# Patient Record
Sex: FEMALE | Race: WHITE | NOT HISPANIC OR LATINO
[De-identification: names, ages, dates, MRNs, and addresses within clinical notes are randomized per-mention and may not be internally consistent; named-entity substitution may affect disease eponyms.]

---

## 2018-01-03 ENCOUNTER — TRANSCRIPTION ENCOUNTER (OUTPATIENT)
Age: 33
End: 2018-01-03

## 2018-02-17 PROBLEM — Z00.00 ENCOUNTER FOR PREVENTIVE HEALTH EXAMINATION: Status: ACTIVE | Noted: 2018-02-17

## 2018-03-19 ENCOUNTER — APPOINTMENT (OUTPATIENT)
Dept: OTOLARYNGOLOGY | Facility: CLINIC | Age: 33
End: 2018-03-19

## 2018-05-18 ENCOUNTER — TRANSCRIPTION ENCOUNTER (OUTPATIENT)
Age: 33
End: 2018-05-18

## 2018-11-20 ENCOUNTER — TRANSCRIPTION ENCOUNTER (OUTPATIENT)
Age: 33
End: 2018-11-20

## 2019-09-02 ENCOUNTER — RESULT CHARGE (OUTPATIENT)
Age: 34
End: 2019-09-02

## 2019-09-03 ENCOUNTER — OUTPATIENT (OUTPATIENT)
Dept: OUTPATIENT SERVICES | Age: 34
LOS: 1 days | Discharge: HOME | End: 2019-09-03

## 2019-09-03 PROBLEM — Q21.2 COMPLETE AV CANAL: Status: ACTIVE | Noted: 2019-09-03

## 2019-09-03 PROBLEM — Q90.9 TRISOMY 21, DOWN SYNDROME: Status: ACTIVE | Noted: 2019-09-03

## 2019-09-04 ENCOUNTER — APPOINTMENT (OUTPATIENT)
Dept: PEDIATRIC CARDIOLOGY | Facility: CLINIC | Age: 34
End: 2019-09-04
Payer: MEDICAID

## 2019-09-04 VITALS
SYSTOLIC BLOOD PRESSURE: 103 MMHG | HEART RATE: 65 BPM | BODY MASS INDEX: 28.69 KG/M2 | OXYGEN SATURATION: 97 % | DIASTOLIC BLOOD PRESSURE: 54 MMHG | HEIGHT: 56.69 IN | WEIGHT: 131.18 LBS

## 2019-09-04 DIAGNOSIS — Z87.19 PERSONAL HISTORY OF OTHER DISEASES OF THE DIGESTIVE SYSTEM: ICD-10-CM

## 2019-09-04 DIAGNOSIS — Q21.2 ATRIOVENTRICULAR SEPTAL DEFECT: ICD-10-CM

## 2019-09-04 DIAGNOSIS — Z87.09 PERSONAL HISTORY OF OTHER DISEASES OF THE RESPIRATORY SYSTEM: ICD-10-CM

## 2019-09-04 DIAGNOSIS — Q90.9 DOWN SYNDROME, UNSPECIFIED: ICD-10-CM

## 2019-09-04 PROCEDURE — 93303 ECHO TRANSTHORACIC: CPT

## 2019-09-04 PROCEDURE — 93320 DOPPLER ECHO COMPLETE: CPT

## 2019-09-04 PROCEDURE — 99204 OFFICE O/P NEW MOD 45 MIN: CPT | Mod: 25

## 2019-09-04 PROCEDURE — 93000 ELECTROCARDIOGRAM COMPLETE: CPT

## 2019-09-04 PROCEDURE — 93325 DOPPLER ECHO COLOR FLOW MAPG: CPT

## 2019-09-04 RX ORDER — UBIDECARENONE/VIT E ACET 100MG-5
25 MCG CAPSULE ORAL
Refills: 0 | Status: DISCONTINUED | COMMUNITY
End: 2019-09-04

## 2019-09-04 RX ORDER — LORATADINE 10 MG
17 TABLET,DISINTEGRATING ORAL
Refills: 0 | Status: ACTIVE | COMMUNITY

## 2019-09-04 RX ORDER — LORATADINE 10 MG
TABLET ORAL
Refills: 0 | Status: ACTIVE | COMMUNITY

## 2019-09-04 NOTE — REASON FOR VISIT
[Initial Consultation] : an initial consultation for [S/P Cardiac Surgery] : status post cardiac surgery [Complete Atrioventricular Canal] : a complete atrioventricular canal [Foster Parents/Guardian] : /guardian [Mitral Regurgitation] : mitral regurgitation [Medical Records] : medical records [Patient] : patient

## 2019-09-05 NOTE — DISCUSSION/SUMMARY
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  Since her repair in 1984, she has done well and her echocardiogram demonstrates  durable repair.  She has mild to moderate mitral insufficiency but preserved function and normal estimated PA pressures.  Moving forward, we will monitor for worsening in mitral regurgitation and associated arrhythmia, elevated pulmonary pressures, or left ventricular dysfunction.  Also, we reviewed with the her and the staff at her home to monitor for abnormal heart beats or elevations in her heart rate.  Given we do not have the operative notes from her re-do surgery, it would be reasonable to continue SBE prophylaxis.\par \par Follow up in one year, sooner if any concerns arise.  [Needs SBE Prophylaxis] : [unfilled]  needs bacterial endocarditis prophylaxis. SBE prophylaxis is indicated for dental and invasive ENT procedures. (Circulation. 2007; 116: 6011-2693) [May participate in all age-appropriate activities] : [unfilled] May participate in all age-appropriate activities.

## 2019-09-05 NOTE — PHYSICAL EXAM
[General Appearance - Alert] : alert [General Appearance - In No Acute Distress] : in no acute distress [General Appearance - Well Nourished] : well nourished [General Appearance - Well Developed] : well developed [General Appearance - Well-Appearing] : well appearing [Down Syndrome] : Down Syndrome [Sclera] : the conjunctiva were normal [Examination Of The Oral Cavity] : mucous membranes were moist and pink [Respiration, Rhythm And Depth] : normal respiratory rhythm and effort [Auscultation Breath Sounds / Voice Sounds] : breath sounds clear to auscultation bilaterally [No Cough] : no cough [Chest Surgical / Traumatic Scar] : chest incision well healed [Apical Impulse] : quiet precordium with normal apical impulse [Heart Rate And Rhythm] : normal heart rate and rhythm [Heart Sounds] : normal S1 and S2 [Heart Sounds Gallop] : no gallops [Heart Sounds Pericardial Friction Rub] : no pericardial rub [Heart Sounds Click] : no clicks [Arterial Pulses] : normal upper and lower extremity pulses with no pulse delay [Edema] : no edema [II] : a grade 2/6 [Capillary Refill Test] : normal capillary refill [Apical] : apex [Holosystolic] : holosystolic [L Axilla] : the murmur was transmitted to the left axilla [Nondistended] : nondistended [Abdomen Soft] : soft [Abdomen Tenderness] : non-tender [] : no hepato-splenomegaly [Skin Color & Pigmentation] : normal skin color and pigmentation [Nail Clubbing] : no clubbing  or cyanosis of the fingers [Demonstrated Behavior - Infant Nonreactive To Parents] : interactive

## 2019-09-05 NOTE — REVIEW OF SYSTEMS
[Wgt Loss (___ Lbs)] : no recent weight loss [Change in Vision] : no change in vision [Loss Of Hearing] : no hearing loss [Cyanosis] : no cyanosis [Edema] : no edema [Diaphoresis] : not diaphoretic [Chest Pain] : no chest pain or discomfort [Exercise Intolerance] : no persistence of exercise intolerance [Orthopnea] : no orthopnea [Fast HR] : no tachycardia [Tachypnea] : not tachypneic [Shortness Of Breath] : not expressed as feeling short of breath [Vomiting] : no vomiting [Diarrhea] : no diarrhea [Decrease In Appetite] : appetite not decreased [Constipation] : constipation [Fainting (Syncope)] : no fainting [Joint Swelling] : no joint swelling [Easy Bleeding] : no ~M tendency for easy bleeding [Sleep Disturbances] : ~T no sleep disturbances [Heat/Cold Intolerance] : no temperature intolerance [Dec Urine Output] : no oliguria

## 2019-09-05 NOTE — CONSULT LETTER
[Today's Date] : [unfilled] [] : : ~~ [Name] : Name: [unfilled] [____:] :  [unfilled]: [Today's Date:] : [unfilled] [Consult] : I had the pleasure of evaluating your patient, [unfilled]. My full evaluation follows. [Sincerely,] : Sincerely, [FreeTextEntry5] : [address], [Consult - Multiple Provider] : Thank you very much for allowing us to participate in the care of this patient. If you have any questions, please do not hesitate to contact us. [FreeTextEntry4] : Virginia Victoria, NP [FreeTextEntry7] : p: [phone] [FreeTextEntry6] :  [place] [address]  [de-identified] : Meliza De, MSN, CPNP-AC, PC\par Pediatric Cardiology, Adult Congenital Cardiology\par [facility]\par \par Zen Chirinos MD\par Pediatric Cardiology\par Adult Congenital Heart Disease\par  of Pediatrics\par [employer] [facility] [employer] at [facility] [FreeTextEntry8] : f: [phone]

## 2019-09-05 NOTE — CARDIOLOGY SUMMARY
[de-identified] : 9/4/2019 [FreeTextEntry1] : normal sinus rhythm\par right bundle branch block (QRSd 144 ms) [de-identified] : 9/4/2019 [FreeTextEntry2] : Summary:\par  1.  {S,D,S } Situs solitus, D-ventricular looping, normally related great arteries.\par  2. Complete atrioventricular canal defect.\par  3. Status post complete common atrioventricular valve canal repair.\par  4. S/p repair of atrial septal defect dehiscence.\par  5. No residual interatrial shunt identified.\par  6. No residual ventricular septal defect.\par  7. Mild right atrioventricular valve regurgitation and mild to moderate left atrioventricular valve insufficiency.\par  8. Normal right ventricular morphology with qualitatively normal size and systolic function.\par  9. Normal left ventricular size, morphology and systolic function.\par 10. No pericardial effusion.

## 2019-09-05 NOTE — HISTORY OF PRESENT ILLNESS
[FreeTextEntry1] : We had the pleasure of seeing Juju in The [facility] on September 4, 2019 for an initial consultation.  As you know, Juju is a 34 year old female with Trisomy 21 who was born with an complete atrioventricular canal defect.  According to the records we have available from her prior cardiologist, Dr. East, she underwent the following interventions:\par \par 1. Repair of AV canal, 5/1984, [facility] in [place], [place]\par 2.  Diagnostic cath, 11/1984, [facility]- found to be in CHF with dehiscence of ASD patch with severe MR\par 3.  Repair of AV canal with resuspension of mitral valve, closure of cleft, and closure of primum ASD, 11/1984, [facility]\par \par She currently lives at a group home/[facility] where she is doing well.  She reports she likes to exercise on a treadmill.  There have been no reports of shortness of breath, dyspnea on exertion, palpitations, chest pain, near syncope, or syncope.  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

## 2020-09-02 ENCOUNTER — APPOINTMENT (OUTPATIENT)
Dept: PEDIATRIC CARDIOLOGY | Facility: CLINIC | Age: 35
End: 2020-09-02

## 2020-09-11 ENCOUNTER — APPOINTMENT (OUTPATIENT)
Dept: PEDIATRIC CARDIOLOGY | Facility: CLINIC | Age: 35
End: 2020-09-11
Payer: MEDICAID

## 2020-09-11 VITALS
HEIGHT: 56.69 IN | RESPIRATION RATE: 16 BRPM | BODY MASS INDEX: 25.56 KG/M2 | DIASTOLIC BLOOD PRESSURE: 69 MMHG | OXYGEN SATURATION: 100 % | HEART RATE: 62 BPM | WEIGHT: 116.84 LBS | SYSTOLIC BLOOD PRESSURE: 113 MMHG

## 2020-09-11 DIAGNOSIS — Z78.9 OTHER SPECIFIED HEALTH STATUS: ICD-10-CM

## 2020-09-11 DIAGNOSIS — Z98.890 OTHER SPECIFIED POSTPROCEDURAL STATES: ICD-10-CM

## 2020-09-11 PROCEDURE — 93000 ELECTROCARDIOGRAM COMPLETE: CPT

## 2020-09-11 PROCEDURE — 93320 DOPPLER ECHO COMPLETE: CPT

## 2020-09-11 PROCEDURE — 93325 DOPPLER ECHO COLOR FLOW MAPG: CPT

## 2020-09-11 PROCEDURE — 99215 OFFICE O/P EST HI 40 MIN: CPT | Mod: 25

## 2020-09-11 PROCEDURE — 93303 ECHO TRANSTHORACIC: CPT

## 2020-09-11 NOTE — REASON FOR VISIT
[Follow-Up] : a follow-up visit for [Complete Atrioventricular Canal] : a complete atrioventricular canal [Trisomy 21 (Down Syndrome)] : Trisomy 21  [Father] : father

## 2020-09-13 NOTE — PHYSICAL EXAM
[General Appearance - Well Developed] : well developed [General Appearance - Well Nourished] : well nourished [General Appearance - In No Acute Distress] : in no acute distress [General Appearance - Alert] : alert [Sclera] : the conjunctiva were normal [Down Syndrome] : Down Syndrome [Examination Of The Oral Cavity] : mucous membranes were moist and pink [FreeTextEntry1] : wearing face shield [No Cough] : no cough [Auscultation Breath Sounds / Voice Sounds] : breath sounds clear to auscultation bilaterally [Respiration, Rhythm And Depth] : normal respiratory rhythm and effort [Apical Impulse] : quiet precordium with normal apical impulse [Chest Surgical / Traumatic Scar] : chest incision well healed [Heart Sounds Gallop] : no gallops [Heart Rate And Rhythm] : normal heart rate and rhythm [Heart Sounds] : normal S1 and S2 [Heart Sounds Pericardial Friction Rub] : no pericardial rub [Arterial Pulses] : normal upper and lower extremity pulses with no pulse delay [Edema] : no edema [Capillary Refill Test] : normal capillary refill [LLSB] : LLSB  [II] : a grade 2/6 [LMSB] : LMSB  [High] : high pitched [I] : a grade 1/6  [Systolic] : systolic [Apical] : apex [Nondistended] : nondistended [Abdomen Soft] : soft [Nail Clubbing] : no clubbing  or cyanosis of the fingernails [] : no hepato-splenomegaly [Abdomen Tenderness] : non-tender [Skin Color & Pigmentation] : normal skin color and pigmentation [Mood] : mood and affect were appropriate for age [Demonstrated Behavior - Infant Nonreactive To Parents] : interactive

## 2020-09-13 NOTE — DISCUSSION/SUMMARY
[FreeTextEntry1] : In summary, Juju is a 35 year old female with Trisomy 21 and a complete AV canal defect status post repair in infancy followed by reoperation for patch dehiscence of the primum ASD and significant left sided AV valve regurgitation. We reviewed that her echocardiogram shows a durable repair. There is normal biventricular function, no residual intracardiac shunts, and mild left and right atrioventricular valve regurgitation.  There is stable, mild, left AV valve stenosis without any concerns for elevation in pulmonary artery pressures.  She has great exercise tolerance by report and no cardiac symptoms.  We recommended continued follow up for the progression of valve stenosis and/or regurgitation. Given the operative reports from the second repair are not available, it would be reasonable to continue SBE prophylaxis. \par \par Follow up in one year, sooner if any clinical concerns arise. [Needs SBE Prophylaxis] : [unfilled]  needs bacterial endocarditis prophylaxis. SBE prophylaxis is indicated for dental and invasive ENT procedures. (Circulation. 2007; 116: 3228-8775) [Influenza vaccine is recommended] : Influenza vaccine is recommended [May participate in all age-appropriate activities] : [unfilled] May participate in all age-appropriate activities.

## 2020-09-13 NOTE — HISTORY OF PRESENT ILLNESS
[FreeTextEntry1] : We had the pleasure of seeing Juju in The [facility] on September 11, 2020 for follow up.  As you know, Juju is a 35 year old female with Trisomy 21 who was born with an complete atrioventricular canal defect.  According to the records we have available from her prior cardiologist, Dr. East, she underwent the following interventions:\par \par 1.  Repair of AV canal, 5/1984, [facility] in [place], [place]\par 2.  Diagnostic cath, 11/1984, [facility]- found to be in CHF with dehiscence of ASD patch with severe MR\par 3.  Repair of AV canal with resuspension of mitral valve, closure of cleft, and closure of primum ASD, 11/1984, [facility]\par \par Since our first visit with her last year, Juju has done well.  The family elected to take her out of her group home prior to the start of the pandemic.  She has spent her time between [place] and their cabin in [place].  She goes for frequent walks and exercises at home with her mother. She has lost 15 pounds from the increased exercise and improved diet. \par \par She does not complain of shortness of breath, dyspnea on exertion, palpitations, chest pain, near syncope, or syncope. \par \par She is accompanied to the visit today by her father.\par \par Her other commodities include hypothyroidism and vitamin D deficiency.

## 2020-09-13 NOTE — CONSULT LETTER
[Today's Date] : [unfilled] [Name] : Name: [unfilled] [Today's Date:] : [unfilled] [] : : ~~ [Dear  ___:] : Dear Dr. [unfilled]: [Consult - Multiple Provider] : Thank you very much for allowing us to participate in the care of this patient. If you have any questions, please do not hesitate to contact us. [Consult] : I had the pleasure of evaluating your patient, [unfilled]. My full evaluation follows. [Sincerely,] : Sincerely, [FreeTextEntry4] : Virginia Victoria, NP [FreeTextEntry5] : [address], [FreeTextEntry7] : p: [phone] [FreeTextEntry6] :  [place] [address]  [FreeTextEntry8] : f: [phone] [de-identified] : Meliza De, MSN, CPNP-AC, PC\par Pediatric Cardiology, Adult Congenital Cardiology\par [facility]\par \par Zen Chirinos MD\par Pediatric Cardiology\par Adult Congenital Heart Disease\par  of Pediatrics\par [employer] [facility] [employer] at [facility]

## 2020-09-13 NOTE — CARDIOLOGY SUMMARY
[de-identified] : 9/11/2020 [FreeTextEntry1] : normal sinus rhythm\par left axis deviation\par right bundle branch block\par  [FreeTextEntry2] : 1. 35 year old young lady with Down syndrome and born with complete atrioventricular canal defect; s/p repair of AV canal (5/1984); s/p resuspension of mitral valve, closure of cleft and closure of primum ASD for patch dehiscence (11/1984).\par 2. Status of interatrial septum unknown.\par 3. No residual ventricular septal defect.\par 4. Trivial aortic valve regurgitation.\par 5. Mild left and mild right atrioventricular valve regurgitation.\par 6. Two separate jets of central left atrioventricular valve regurgitation without significant residual cleft.\par 7. Right ventricular systolic pressure estimate = 33.8 mmHg (estimated right atrial pressure of 5 mmHg).\par 8. Mile left and no right atrioventricular valve stenosis.\par 9. Mitral valve mean gradient = 4.3 mmHg.\par 10. No evidence of left ventricular outflow tract obstruction.\par 11. Normal left ventricular systolic function.\par 12. Qualitatively normal right ventricular systolic function.\par 13. No pericardial effusion. [de-identified] : 9/11/2020

## 2020-09-13 NOTE — REVIEW OF SYSTEMS
[Wgt Loss (___ Lbs)] : recent [unfilled] lb weight loss [Change in Vision] : no change in vision [Loss Of Hearing] : no hearing loss [Edema] : no edema [Cyanosis] : no cyanosis [Diaphoresis] : not diaphoretic [Chest Pain] : no chest pain or discomfort [Orthopnea] : no orthopnea [Palpitations] : no palpitations [Exercise Intolerance] : no persistence of exercise intolerance [Fast HR] : no tachycardia [Tachypnea] : not tachypneic [Cough] : no cough [Shortness Of Breath] : not expressed as feeling short of breath [Abdominal Pain] : no abdominal pain [Fainting (Syncope)] : no fainting [Easy Bruising] : no tendency for easy bruising [Easy Bleeding] : no ~M tendency for easy bleeding [Failure To Thrive] : no failure to thrive [Dec Urine Output] : no oliguria

## 2021-09-01 ENCOUNTER — APPOINTMENT (OUTPATIENT)
Dept: PEDIATRIC CARDIOLOGY | Facility: CLINIC | Age: 36
End: 2021-09-01

## 2021-10-03 ENCOUNTER — RESULT CHARGE (OUTPATIENT)
Age: 36
End: 2021-10-03

## 2021-10-04 ENCOUNTER — APPOINTMENT (OUTPATIENT)
Dept: PEDIATRIC CARDIOLOGY | Facility: CLINIC | Age: 36
End: 2021-10-04
Payer: MEDICAID

## 2021-10-04 VITALS
DIASTOLIC BLOOD PRESSURE: 74 MMHG | HEART RATE: 68 BPM | SYSTOLIC BLOOD PRESSURE: 116 MMHG | WEIGHT: 127.43 LBS | BODY MASS INDEX: 27.49 KG/M2 | HEIGHT: 57.09 IN | RESPIRATION RATE: 18 BRPM | OXYGEN SATURATION: 98 %

## 2021-10-04 PROCEDURE — 93320 DOPPLER ECHO COMPLETE: CPT

## 2021-10-04 PROCEDURE — 99214 OFFICE O/P EST MOD 30 MIN: CPT

## 2021-10-04 PROCEDURE — 93303 ECHO TRANSTHORACIC: CPT

## 2021-10-04 PROCEDURE — 93325 DOPPLER ECHO COLOR FLOW MAPG: CPT

## 2021-10-04 PROCEDURE — 93000 ELECTROCARDIOGRAM COMPLETE: CPT

## 2021-10-04 NOTE — PHYSICAL EXAM
[General Appearance - Alert] : alert [Down Syndrome] : Down Syndrome [Sclera] : the conjunctiva were normal [Nasal Cavity] : the nasal mucosa was normal [No Cough] : no cough [Sternotomy] : sternotomy [Well-Healed] : well-healed [Unremarkable] : unremarkable [Apical Impulse] : quiet precordium with normal apical impulse [Heart Rate And Rhythm] : normal heart rate and rhythm [Normal S1] : normal  [S2 Fixed Splitting] : had fixed splitting [Systolic] : systolic [III] : a grade 3/6   [LLSB] : LLSB  [LMSB] : LMSB  [Holosystolic] : holosystolic [High] : high pitched [L Axilla] : the murmur was transmitted to the left axilla [Abdomen Soft] : soft [] : no hepato-splenomegaly [Nail Clubbing] : no clubbing  or cyanosis of the fingers [Skin Color & Pigmentation] : normal skin color and pigmentation [Demonstrated Behavior - Infant Nonreactive To Parents] : interactive

## 2021-10-13 NOTE — REVIEW OF SYSTEMS
[Feeling Poorly] : not feeling poorly (malaise) [Fever] : no fever [Cyanosis] : no cyanosis [Edema] : no edema [Chest Pain] : no chest pain or discomfort [Exercise Intolerance] : no persistence of exercise intolerance [Palpitations] : no palpitations [Orthopnea] : no orthopnea [Cough] : no cough [Shortness Of Breath] : not expressed as feeling short of breath [Decrease In Appetite] : appetite not decreased [Fainting (Syncope)] : no fainting [Headache] : no headache [Dizziness] : no dizziness [Easy Bruising] : no tendency for easy bruising [Easy Bleeding] : no ~M tendency for easy bleeding [Sleep Disturbances] : ~T no sleep disturbances [Depression] : no depression [Anxiety] : no anxiety [Heat/Cold Intolerance] : no temperature intolerance

## 2021-10-13 NOTE — DISCUSSION/SUMMARY
[Needs SBE Prophylaxis] : [unfilled]  needs bacterial endocarditis prophylaxis. SBE prophylaxis is indicated for dental and invasive ENT procedures. (Circulation. 2007; 116: 7614-1051) [FreeTextEntry1] : In summary, Juju is a 36 year old female with Trisomy 21 and a complete AV canal defect status post repair in infancy followed by reoperation for patch dehiscence of the primum ASD and significant left sided AV valve regurgitation. Her echocardiogram today is unchanged and continues to show a durable repair. There is normal biventricular function, no residual intracardiac shunts, and mild left and right atrioventricular valve regurgitation. Given the operative reports from the second repair are not available, it would be reasonable to continue SBE prophylaxis. \par \par Follow up in one year, sooner if any clinical concerns arise.\par

## 2021-10-13 NOTE — HISTORY OF PRESENT ILLNESS
[FreeTextEntry1] : We had the pleasure of seeing Juju in The [facility] on October 4, 2021 for follow up. As you know, Juju is a 36year old female with Trisomy 21 who was born with an complete atrioventricular canal defect. According to the records we have available from her prior cardiologist, Dr. aEst, she underwent the following interventions:\par \par 1. Repair of AV canal, 5/1984, [facility] in [place], [place]\par 2. Diagnostic cath, 11/1984, [facility]- found to be in CHF with dehiscence of ASD patch with severe MR\par 3. Repair of AV canal with resuspension of mitral valve, closure of cleft, and closure of primum ASD, 11/1984, [facility]\par \par Since her visit last year, Juju has done well. She has been back in the group home since last September after having been home during the height of the pandemic. She has regained 10 of the ~15 pounds she had lost when she was at home with her parents. She is back in her day program where she does cooking and other life skills.\par \par She does not complain of shortness of breath, dyspnea on exertion, palpitations, chest pain, near syncope, or syncope. \par \par She is accompanied to the visit today by an aide from the group home..\par \par Her other commodities include hypothyroidism and vitamin D deficiency\par

## 2021-10-13 NOTE — REASON FOR VISIT
[Follow-Up] : a follow-up visit for [Trisomy 21 (Down Syndrome)] : Trisomy 21  [Foster Parents/Guardian] : /guardian [Patient] : patient [Medical Records] : medical records [FreeTextEntry3] : Complete Atrioventricular Canal

## 2021-10-13 NOTE — CONSULT LETTER
[Today's Date] : [unfilled] [Name] : Name: [unfilled] [] : : ~~ [Today's Date:] : [unfilled] [Dear  ___:] : Dear Dr. [unfilled]: [Consult] : I had the pleasure of evaluating your patient, [unfilled]. My full evaluation follows. [Consult - Multiple Provider] : Thank you very much for allowing us to participate in the care of this patient. If you have any questions, please do not hesitate to contact us. [Sincerely,] : Sincerely, [FreeTextEntry4] : Virginia Victoria, NP [FreeTextEntry5] : [address], [FreeTextEntry6] :  [place] [address]  [FreeTextEntry7] : p: [phone] [FreeTextEntry8] : f: [phone] [de-identified] : Meliza De, MSN, CPNP-AC, PC\par Pediatric Cardiology, Adult Congenital Cardiology\par [facility]\par [facility]\par \par Gregorio Silva MD, TORY\par Medical Director, Adult Congenital Heart Program\par Professor of Pediatrics\par [employer] [facility] [employer] at [facility]\par \par \par

## 2022-03-20 ENCOUNTER — TRANSCRIPTION ENCOUNTER (OUTPATIENT)
Age: 37
End: 2022-03-20

## 2022-04-03 ENCOUNTER — RESULT CHARGE (OUTPATIENT)
Age: 37
End: 2022-04-03

## 2022-04-06 ENCOUNTER — APPOINTMENT (OUTPATIENT)
Dept: PEDIATRIC CARDIOLOGY | Facility: CLINIC | Age: 37
End: 2022-04-06
Payer: MEDICAID

## 2022-04-06 VITALS
HEART RATE: 81 BPM | SYSTOLIC BLOOD PRESSURE: 112 MMHG | WEIGHT: 115.52 LBS | BODY MASS INDEX: 24.92 KG/M2 | HEIGHT: 57.09 IN | DIASTOLIC BLOOD PRESSURE: 69 MMHG | OXYGEN SATURATION: 99 %

## 2022-04-06 PROCEDURE — 93303 ECHO TRANSTHORACIC: CPT

## 2022-04-06 PROCEDURE — 99214 OFFICE O/P EST MOD 30 MIN: CPT

## 2022-04-06 PROCEDURE — 93320 DOPPLER ECHO COMPLETE: CPT

## 2022-04-06 PROCEDURE — 93000 ELECTROCARDIOGRAM COMPLETE: CPT

## 2022-04-06 PROCEDURE — 93325 DOPPLER ECHO COLOR FLOW MAPG: CPT

## 2022-04-06 NOTE — PHYSICAL EXAM
[Apical Impulse] : quiet precordium with normal apical impulse [Heart Rate And Rhythm] : normal heart rate and rhythm [Arterial Pulses] : normal upper and lower extremity pulses with no pulse delay [Edema] : no edema [Normal S1] : normal  [Normal] : normal  [S2 Wide Splitting] : had wide splitting [Systolic] : systolic [II] : a grade 2/6 [LLSB] : LLSB  [LMSB] : LMSB  [Back] : the murmur was transmitted to the back [Diastolic] : diastolic [I] : a grade 1/4  [Rumbling] : rumbling [General Appearance - Alert] : alert [Down Syndrome] : Down Syndrome [Auscultation Breath Sounds / Voice Sounds] : breath sounds clear to auscultation bilaterally [No Cough] : no cough [Well-Healed] : well-healed [Sternotomy] : sternotomy [Abdomen Soft] : soft [Nail Clubbing] : no clubbing  or cyanosis of the fingers [Demonstrated Behavior - Infant Nonreactive To Parents] : interactive [Skin Color & Pigmentation] : normal skin color and pigmentation

## 2022-04-12 NOTE — CARDIOLOGY SUMMARY
[Today's Date] : [unfilled] [FreeTextEntry1] : NSR, ventricular rate 65 bpm, RBBB, left axis deviation [FreeTextEntry2] : Summary:\par 1. Status post repair of complete atrioventricular canal defect (5/1984) followed by reoperation for\par resuspension of left AV valve, closure of cleft, and closure of primum ASD for patch dehiscence\par (11/1984).\par 2. No residual interatrial shunt identified.\par 3. No residual ventricular septal defect.\par 4. Normal left ventricular size, morphology and systolic function.\par 5. Mildly dilated left atrium.\par 6. Moderate mitral valve regurgitation.\par 7. Mitral valve mean gradient = 2.1 mmHg.\par 8. Mild tricuspid valve regurgitation.\par 9. Normal right ventricular morphology with qualitatively normal size and systolic function.\par 10. Normal left ventricular systolic function.\par 11. Trivial aortic valve regurgitation.\par 12. No pericardial effusion.\par 13. There has been no significant interval change since her previous evaluation.

## 2022-04-12 NOTE — REVIEW OF SYSTEMS
[Headache] : headache [Feeling Poorly] : not feeling poorly (malaise) [Exercise Intolerance] : no persistence of exercise intolerance [Palpitations] : no palpitations [Fast HR] : no tachycardia [Cough] : no cough [Shortness Of Breath] : not expressed as feeling short of breath [Fainting (Syncope)] : no fainting [Easy Bruising] : no tendency for easy bruising [Easy Bleeding] : no ~M tendency for easy bleeding [Sleep Disturbances] : ~T no sleep disturbances [Depression] : no depression [Anxiety] : no anxiety [Heat/Cold Intolerance] : no temperature intolerance

## 2022-04-12 NOTE — HISTORY OF PRESENT ILLNESS
[FreeTextEntry1] : We had the pleasure of seeing Juju in [facility] on April 6, 2022 for follow up. 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 According to the records we have available from her prior cardiologist, Dr. East, she underwent the following interventions:\par \par 1. Repair of AV canal, 5/1984, [facility] in [place], [place]\par 2. Diagnostic cath, 11/1984, [facility]- found to be in CHF with dehiscence of ASD patch with severe MR\par 3. Repair of AV canal with resuspension of mitral valve, closure of cleft, and closure of primum ASD, 11/1984, [facility]\par \par Juju was recently hospitalized at [facility] for what was reported to be a splenic infarct. Her hospital records that came with her today appear to imply that she was treated for pneumonia. Either way, Juju is currently home with her mother and feeling better. She has not yet returned to the group home. She has lost 15 pounds since her mother is monitoring her diet. She has also been exercising. She was discharged on Aspirin for a superficial venous thrombosis of the left upper extremity. There is no reported bleeding or bruising.\par \par Her cardiovascular review of systems is completely unremarkable. Specifically, she has no complaints of chest pain, palpitations, shortness of breath, peripheral edema, dizziness or syncope.\par \par She is accompanied today by both parents.\par

## 2022-04-12 NOTE — CONSULT LETTER
[Today's Date] : [unfilled] [Name] : Name: [unfilled] [] : : ~~ [Today's Date:] : [unfilled] [____:] :  [unfilled]: [Consult] : I had the pleasure of evaluating your patient, [unfilled]. My full evaluation follows. [Consult - Multiple Provider] : Thank you very much for allowing us to participate in the care of this patient. If you have any questions, please do not hesitate to contact us. [Sincerely,] : Sincerely, [FreeTextEntry4] : Virginia Victoria, NP [FreeTextEntry5] : [address], [FreeTextEntry6] :  [place] [address]  [FreeTextEntry7] : p: [phone] [FreeTextEntry8] : f: [phone] [de-identified] : Meliza De, MSN, CPNP-AC, PC\par Pediatric Cardiology, Adult Congenital Cardiology\par [facility]\par [facility]\par \par Gregorio Silva MD, TORY\par Medical Director, Adult [facility] Program\par Professor of Pediatrics\par [employer] [facility] [employer] at [facility]\par

## 2022-08-08 ENCOUNTER — OUTPATIENT (OUTPATIENT)
Dept: OUTPATIENT SERVICES | Facility: HOSPITAL | Age: 37
LOS: 1 days | Discharge: HOME | End: 2022-08-08

## 2022-08-08 ENCOUNTER — APPOINTMENT (OUTPATIENT)
Dept: OTOLARYNGOLOGY | Facility: CLINIC | Age: 37
End: 2022-08-08

## 2022-08-08 VITALS — DIASTOLIC BLOOD PRESSURE: 59 MMHG | SYSTOLIC BLOOD PRESSURE: 91 MMHG | HEART RATE: 75 BPM

## 2022-08-08 VITALS — HEIGHT: 57 IN | BODY MASS INDEX: 24.81 KG/M2 | WEIGHT: 115 LBS

## 2022-08-08 PROCEDURE — 99203 OFFICE O/P NEW LOW 30 MIN: CPT | Mod: 25

## 2022-08-08 PROCEDURE — 92567 TYMPANOMETRY: CPT

## 2022-08-08 PROCEDURE — 92504 EAR MICROSCOPY EXAMINATION: CPT

## 2022-08-08 PROCEDURE — 92557 COMPREHENSIVE HEARING TEST: CPT

## 2022-08-08 RX ORDER — PANTOPRAZOLE 40 MG/1
40 TABLET, DELAYED RELEASE ORAL
Refills: 0 | Status: ACTIVE | COMMUNITY

## 2022-08-08 RX ORDER — ALBUTEROL SULFATE 2.5 MG/3ML
(2.5 MG/3ML) SOLUTION RESPIRATORY (INHALATION)
Qty: 75 | Refills: 0 | Status: DISCONTINUED | COMMUNITY
Start: 2022-06-23

## 2022-08-08 RX ORDER — LEVOTHYROXINE SODIUM 0.17 MG/1
TABLET ORAL
Refills: 0 | Status: ACTIVE | COMMUNITY

## 2022-08-08 NOTE — HISTORY OF PRESENT ILLNESS
[de-identified] : 36 year old female presents for an initial consultation for bilateral clogged ears. \par States doing well presents for routine cleaning of both ears. \par Some Hearing loss noted in the past from previous ENT\par Was follow previously ENT but insurance changed occur so was not able to follow up\par Patient denies otalgia, otorrhea, ear infections, tinnitus, dizziness, vertigo, headaches related to hearing.\par

## 2022-08-08 NOTE — ASSESSMENT
[FreeTextEntry1] : 36 year F with bilateral cerumen impaction and bilateral SNHL. Patient tolerated cerumen removed without complaints. Audiogram shows AU Mild SNHL 250-1k hz recovered to WNL 2-3k hz sloping to severe at 8k hz. AS Tymp A. AD Tymp B \par \par Recommend:\par Cerumen Impaction\par -Discussed not using q-tips or instruments to remove wax\par -Olive or mineral oil 1x times every 2 week to keep ear canal lubricated. Discussed that the ear is a self cleaning structure and just allow it clean itself. If wax builds up can try debrox. Once it gets impacted recommend return to get it cleaned out.\par \par SNHL\par -Discussed Benefit of Hearings Aids and their value of helping keep brain stimulated by helping hear conversation which keeps a person active and socially connected. Stressed also the association with a lower risk of incident dementia and slower cognitive decline.\par -Patient is currently comfortable with her hearing and would like to continue to monitor hearing loss. Doesn't want hearing aids at this time.\par \par -Return to clinic annually or sooner if new/worsen symptoms present\par

## 2022-08-08 NOTE — DATA REVIEWED
[de-identified] : I have independently reviewed the patients audiogram from today and my findings include AU Mild SNHL 250-1k hz recovered to WNL 2-3k hz sloping to severe at 8k hz. AS Laya BARRERA. PAO Asif B

## 2022-08-08 NOTE — PHYSICAL EXAM
[Binocular Microscopic Exam] : Binocular microscopic exam was performed [Normal] : temporomandibular joint is normal [FreeTextEntry8] : cerumen impaction. removed [FreeTextEntry9] : cerumen impaction. removed

## 2022-08-08 NOTE — REASON FOR VISIT
[Initial Evaluation] : an initial evaluation for [Formal Caregiver] : formal caregiver [FreeTextEntry2] : bilateral clogged ears

## 2022-08-08 NOTE — PROCEDURE
[Other ___] : [unfilled] [] : Binocular Microscopy [FreeTextEntry6] : Operative microscope was used to examine the ear canal, ear drum and visible middle ear landmarks. Adequate exam would not have been possible without the use of a microscope. Findings are described.

## 2022-08-11 DIAGNOSIS — H90.3 SENSORINEURAL HEARING LOSS, BILATERAL: ICD-10-CM

## 2022-08-11 DIAGNOSIS — H61.23 IMPACTED CERUMEN, BILATERAL: ICD-10-CM

## 2022-08-11 DIAGNOSIS — H93.8X3 OTHER SPECIFIED DISORDERS OF EAR, BILATERAL: ICD-10-CM

## 2022-08-23 ENCOUNTER — TRANSCRIPTION ENCOUNTER (OUTPATIENT)
Age: 37
End: 2022-08-23

## 2023-05-09 ENCOUNTER — APPOINTMENT (OUTPATIENT)
Dept: PEDIATRIC CARDIOLOGY | Facility: CLINIC | Age: 38
End: 2023-05-09
Payer: MEDICAID

## 2023-05-09 VITALS
DIASTOLIC BLOOD PRESSURE: 66 MMHG | HEART RATE: 62 BPM | WEIGHT: 112.22 LBS | BODY MASS INDEX: 24.21 KG/M2 | HEIGHT: 57.09 IN | RESPIRATION RATE: 18 BRPM | OXYGEN SATURATION: 99 % | SYSTOLIC BLOOD PRESSURE: 115 MMHG

## 2023-05-09 PROCEDURE — 93000 ELECTROCARDIOGRAM COMPLETE: CPT

## 2023-05-09 PROCEDURE — 99214 OFFICE O/P EST MOD 30 MIN: CPT

## 2023-05-09 PROCEDURE — 93303 ECHO TRANSTHORACIC: CPT

## 2023-05-09 PROCEDURE — 93325 DOPPLER ECHO COLOR FLOW MAPG: CPT

## 2023-05-09 PROCEDURE — 93320 DOPPLER ECHO COMPLETE: CPT

## 2023-05-09 RX ORDER — MULTIVITAMIN
TABLET ORAL
Refills: 0 | Status: ACTIVE | COMMUNITY

## 2023-05-09 NOTE — PHYSICAL EXAM
[General Appearance - Alert] : alert [Down Syndrome] : Down Syndrome [Sclera] : the sclera were normal [Examination Of The Oral Cavity] : mucous membranes were moist and pink [Auscultation Breath Sounds / Voice Sounds] : breath sounds clear to auscultation bilaterally [No Cough] : no cough [Sternotomy] : sternotomy [Well-Healed] : well-healed [Apical Impulse] : quiet precordium with normal apical impulse [Heart Rate And Rhythm] : normal heart rate and rhythm [Heart Sounds] : normal S1 and S2 [Edema] : no edema [2+] : left 2+ [Systolic] : systolic [II] : a grade 2/6 [LMSB] : LMSB  [LUSB] : LUSB [Holosystolic] : holosystolic [Diastolic] : diastolic [I] : a grade 1/4  [LLSB] : LLSB  [Villard] : the murmur was transmitted to the apex [] : no hepato-splenomegaly [Nail Clubbing] : no clubbing  or cyanosis of the fingers [Skin Color & Pigmentation] : normal skin color and pigmentation [Demonstrated Behavior - Infant Nonreactive To Parents] : interactive

## 2023-05-11 NOTE — HISTORY OF PRESENT ILLNESS
[FreeTextEntry1] : We had the pleasure of seeing Juju today in [facility]. 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 According to the records we have available from her prior cardiologist, Dr. East, she underwent the following interventions:\par \par 1. Repair of AV canal, 5/1984, [facility] in [place], [place]\par 2. Diagnostic cath, 11/1984, [facility]- found to be in CHF with dehiscence of ASD patch with severe MR\par 3. Repair of AV canal with resuspension of mitral valve, closure of cleft, and closure of primum ASD, 11/1984, [facility]\par \par Juju has returned to her group home after being home with her mother during COVID. She has maintained her 15 pound weight loss and lost an additional 5 pounds since last year. She continues to exercise on the treadmill 25 minutes daily.\par \par Her cardiovascular review of systems is completely unremarkable. Specifically, she has no complaints of chest pain, palpitations, shortness of breath, peripheral edema, dizziness or syncope.\par \par She is accompanied today by an aide from the home.\par \par

## 2023-05-11 NOTE — REASON FOR VISIT
[Follow-Up] : a follow-up visit for [Complete Atrioventricular Canal] : a complete atrioventricular canal [Trisomy 21 (Down Syndrome)] : Trisomy 21  [Foster Parents/Guardian] : /guardian [Patient] : patient [Medical Records] : medical records [Other: _____] : [unfilled] [FreeTextEntry3] : s/p repair

## 2023-05-11 NOTE — CONSULT LETTER
[Today's Date] : [unfilled] [Name] : Name: [unfilled] [] : : ~~ [Today's Date:] : [unfilled] [____:] :  [unfilled]: [Consult] : I had the pleasure of evaluating your patient, [unfilled]. My full evaluation follows. [Consult - Multiple Provider] : Thank you very much for allowing us to participate in the care of this patient. If you have any questions, please do not hesitate to contact us. [Sincerely,] : Sincerely, [FreeTextEntry4] : Virginia Victoria, NP [FreeTextEntry5] : [address], [FreeTextEntry6] :  [place] [address]  [FreeTextEntry7] : p: [phone] [FreeTextEntry8] : f: [phone] [de-identified] : Meliza De, MSN, CPNP-AC, PC\par Pediatric Cardiology, Adult Congenital Cardiology\par [facility]\par [facility]\par \par Gregorio Silva MD, TORY\par Medical Director, Adult Congenital Heart Program\par Professor of Pediatrics\par [employer] [facility] [employer] at [facility]\par

## 2023-05-11 NOTE — CARDIOLOGY SUMMARY
[Today's Date] : [unfilled] [FreeTextEntry1] : NSR, ventricular rate 57 bpm, RBBB, left axis deviation [FreeTextEntry2] : Summary:\par 1. Status post repair of complete atrioventricular canal defect (5/1984) followed by reoperation for\par resuspension of left AV valve, closure of cleft, and closure of primum ASD for patch dehiscence\par (11/1984).\par 2. Moderate mitral valve regurgitation.\par 3. Mildly dilated left atrium.\par 4. Mitral valve mean gradient = 5.0 mmHg.\par 5. Normal left ventricular size, morphology and systolic function.\par 6. Trivial aortic valve regurgitation.\par 7. No residual ventricular septal defect.\par 8. Mild tricuspid valve regurgitation.\par 9. Normal right ventricular morphology with qualitatively normal size and systolic function.\par 10. Right ventricular systolic pressure estimate = 33.1 mmHg (estimated right atrial pressure of 5 mmHg).\par 11. No residual interatrial shunt identified.\par 12. No pericardial effusion.\par 13. Compared to the previous echocardiogram; no significant change

## 2023-05-11 NOTE — DISCUSSION/SUMMARY
[Needs SBE Prophylaxis] : [unfilled]  needs bacterial endocarditis prophylaxis. SBE prophylaxis is indicated for dental and invasive ENT procedures. (Circulation. 2007; 116: 1366-9521) [FreeTextEntry1] : In summary, Juju is a 37 year old female with Trisomy 21 and a complete AV canal defect status post repair in infancy followed by reoperation for patch dehiscence of the primum ASD and significant left sided AV valve regurgitation.\par \par Her echocardiogram today continues to show a durable repair. There is normal biventricular function, no residual intracardiac shunts, and mild left and right atrioventricular valve regurgitation. Given the operative reports from the second repair are not available, it would be reasonable to continue SBE prophylaxis. \par \par We will see her again in one year but of course remain available to see her sooner if clinically indicated.\par \par \par

## 2023-08-28 ENCOUNTER — OUTPATIENT (OUTPATIENT)
Dept: OUTPATIENT SERVICES | Facility: HOSPITAL | Age: 38
LOS: 1 days | Discharge: HOME | End: 2023-08-28

## 2023-08-28 ENCOUNTER — APPOINTMENT (OUTPATIENT)
Dept: OTOLARYNGOLOGY | Facility: CLINIC | Age: 38
End: 2023-08-28
Payer: MEDICAID

## 2023-08-28 VITALS
SYSTOLIC BLOOD PRESSURE: 91 MMHG | HEIGHT: 57 IN | DIASTOLIC BLOOD PRESSURE: 60 MMHG | WEIGHT: 112 LBS | HEART RATE: 61 BPM | BODY MASS INDEX: 24.16 KG/M2

## 2023-08-28 DIAGNOSIS — H93.8X3 OTHER SPECIFIED DISORDERS OF EAR, BILATERAL: ICD-10-CM

## 2023-08-28 PROCEDURE — 92557 COMPREHENSIVE HEARING TEST: CPT

## 2023-08-28 PROCEDURE — 99214 OFFICE O/P EST MOD 30 MIN: CPT | Mod: 25

## 2023-08-28 PROCEDURE — 92567 TYMPANOMETRY: CPT

## 2023-08-28 PROCEDURE — G0268 REMOVAL OF IMPACTED WAX MD: CPT

## 2023-08-28 NOTE — HISTORY OF PRESENT ILLNESS
[de-identified] : 38 year old female presents for follow up for b/l clogged ears.  Last seen 1 year ago.  Hx of Trisomy 21.  Patient denies otalgia, otorrhea, ear infections, hearing loss, tinnitus, dizziness, vertigo, headaches related to hearing.

## 2023-08-28 NOTE — DATA REVIEWED
[de-identified] : An audiogram was ordered and performed including pure tones, tympanometry and speech testing for the patients complaint of hearing loss I have independently reviewed the patient's audiogram from today and my findings include  AU Mild conductive HL at 250 hz rising to normal hearing 4k hz sloping to moderate SNHL through 8k hz. Similar to previous audiogram

## 2023-08-28 NOTE — REASON FOR VISIT
[Subsequent Evaluation] : a subsequent evaluation for [Other: _____] : [unfilled] [FreeTextEntry2] : clogged ears

## 2023-08-28 NOTE — ASSESSMENT
[FreeTextEntry1] : 36 year F follow up bilateral cerumen impaction and bilateral SNHL. Patient tolerated cerumen removed without complaints.   8/8/22: Audiogram shows AU Mild SNHL 250-1k hz recovered to WNL 2-3k hz sloping to severe at 8k hz. AS Tymp A. AD Tymp B   Personally reviewed Audiogram shows AU Mild conductive HL at 250 hz rising to normal hearing 4k hz sloping to moderate SNHL through 8k hz. Similar to previous audiogram  Recommend: Cerumen Impaction -Discussed not using q-tips or instruments to remove wax -Olive or mineral oil 1x times every 2 week to keep ear canal lubricated. Discussed that the ear is a self cleaning structure and just allow it clean itself. If wax builds up can try debrox. Once it gets impacted recommend return to get it cleaned out.  SNHL -Discussed Benefit of Hearings Aids and their value of helping keep brain stimulated by helping hear conversation which keeps a person active and socially connected. Stressed also the association with a lower risk of incident dementia and slower cognitive decline. -Clearance Hearing Aid Evaluation Given if patients/caregivers would like to trial hearing aids.  -Return to clinic annually or sooner if new/worsen symptoms present

## 2023-08-28 NOTE — PHYSICAL EXAM
[Normal] : mucosa is normal [Midline] : trachea located in midline position [FreeTextEntry8] : cerumen impaction. removed [FreeTextEntry9] : cerumen impaction. removed

## 2023-08-31 DIAGNOSIS — H61.23 IMPACTED CERUMEN, BILATERAL: ICD-10-CM

## 2023-08-31 DIAGNOSIS — H93.8X3 OTHER SPECIFIED DISORDERS OF EAR, BILATERAL: ICD-10-CM

## 2023-08-31 DIAGNOSIS — H90.3 SENSORINEURAL HEARING LOSS, BILATERAL: ICD-10-CM

## 2023-12-14 ENCOUNTER — NON-APPOINTMENT (OUTPATIENT)
Age: 38
End: 2023-12-14

## 2023-12-16 ENCOUNTER — NON-APPOINTMENT (OUTPATIENT)
Age: 38
End: 2023-12-16

## 2024-01-24 ENCOUNTER — APPOINTMENT (OUTPATIENT)
Dept: OTOLARYNGOLOGY | Facility: CLINIC | Age: 39
End: 2024-01-24
Payer: MEDICAID

## 2024-01-24 DIAGNOSIS — H90.3 SENSORINEURAL HEARING LOSS, BILATERAL: ICD-10-CM

## 2024-01-24 DIAGNOSIS — H61.23 IMPACTED CERUMEN, BILATERAL: ICD-10-CM

## 2024-01-24 DIAGNOSIS — H93.293 OTHER ABNORMAL AUDITORY PERCEPTIONS, BILATERAL: ICD-10-CM

## 2024-01-24 PROCEDURE — 69210 REMOVE IMPACTED EAR WAX UNI: CPT

## 2024-01-24 RX ORDER — ZOLPIDEM TARTRATE 5 MG/1
TABLET, FILM COATED ORAL
Refills: 0 | Status: ACTIVE | COMMUNITY

## 2024-01-30 ENCOUNTER — NON-APPOINTMENT (OUTPATIENT)
Age: 39
End: 2024-01-30

## 2024-01-31 NOTE — HISTORY OF PRESENT ILLNESS
[de-identified] : 38 years old female with hx of Trisomy 21 presents for clogged ear  Recent cough symptoms. Felt slightly clogged She denies otalgia, otorrhea, hearing loss, known ear infections.

## 2024-01-31 NOTE — PHYSICAL EXAM
[Normal] : mucosa is normal [Midline] : trachea located in midline position [FreeTextEntry8] : cerumen impaction. removed [FreeTextEntry9] : cerumen impaction. removed [de-identified] : central retraction [de-identified] : central retraction

## 2024-04-01 ENCOUNTER — APPOINTMENT (OUTPATIENT)
Dept: OTOLARYNGOLOGY | Facility: CLINIC | Age: 39
End: 2024-04-01

## 2024-05-09 ENCOUNTER — NON-APPOINTMENT (OUTPATIENT)
Age: 39
End: 2024-05-09

## 2024-09-09 ENCOUNTER — APPOINTMENT (OUTPATIENT)
Dept: OTOLARYNGOLOGY | Facility: CLINIC | Age: 39
End: 2024-09-09

## 2024-10-28 ENCOUNTER — NON-APPOINTMENT (OUTPATIENT)
Age: 39
End: 2024-10-28

## 2024-11-01 ENCOUNTER — NON-APPOINTMENT (OUTPATIENT)
Age: 39
End: 2024-11-01

## 2024-12-09 ENCOUNTER — APPOINTMENT (OUTPATIENT)
Dept: OTOLARYNGOLOGY | Facility: CLINIC | Age: 39
End: 2024-12-09
Payer: MEDICAID

## 2024-12-09 ENCOUNTER — OUTPATIENT (OUTPATIENT)
Dept: OUTPATIENT SERVICES | Facility: HOSPITAL | Age: 39
LOS: 1 days | Discharge: HOME | End: 2024-12-09

## 2024-12-09 VITALS — HEART RATE: 71 BPM | HEIGHT: 57 IN | DIASTOLIC BLOOD PRESSURE: 66 MMHG | SYSTOLIC BLOOD PRESSURE: 99 MMHG

## 2024-12-09 DIAGNOSIS — Z87.19 PERSONAL HISTORY OF OTHER DISEASES OF THE DIGESTIVE SYSTEM: ICD-10-CM

## 2024-12-09 DIAGNOSIS — E03.1 CONGENITAL HYPOTHYROIDISM W/OUT GOITER: ICD-10-CM

## 2024-12-09 DIAGNOSIS — H69.93 UNSPECIFIED EUSTACHIAN TUBE DISORDER, BILATERAL: ICD-10-CM

## 2024-12-09 DIAGNOSIS — Z87.09 PERSONAL HISTORY OF OTHER DISEASES OF THE RESPIRATORY SYSTEM: ICD-10-CM

## 2024-12-09 DIAGNOSIS — H93.293 OTHER ABNORMAL AUDITORY PERCEPTIONS, BILATERAL: ICD-10-CM

## 2024-12-09 DIAGNOSIS — Z86.69 PERSONAL HISTORY OF OTHER DISEASES OF THE NERVOUS SYSTEM AND SENSE ORGANS: ICD-10-CM

## 2024-12-09 DIAGNOSIS — Z86.39 PERSONAL HISTORY OF OTHER ENDOCRINE, NUTRITIONAL AND METABOLIC DISEASE: ICD-10-CM

## 2024-12-09 DIAGNOSIS — H90.3 SENSORINEURAL HEARING LOSS, BILATERAL: ICD-10-CM

## 2024-12-09 DIAGNOSIS — H61.23 IMPACTED CERUMEN, BILATERAL: ICD-10-CM

## 2024-12-09 DIAGNOSIS — Z87.898 PERSONAL HISTORY OF OTHER SPECIFIED CONDITIONS: ICD-10-CM

## 2024-12-09 DIAGNOSIS — E66.3 OVERWEIGHT: ICD-10-CM

## 2024-12-09 PROCEDURE — 69210 REMOVE IMPACTED EAR WAX UNI: CPT | Mod: 50

## 2024-12-09 RX ORDER — AMOXICILLIN 500 MG/1
500 TABLET, FILM COATED ORAL
Refills: 0 | Status: ACTIVE | COMMUNITY

## 2024-12-09 RX ORDER — PANTOPRAZOLE 20 MG/1
20 TABLET, DELAYED RELEASE ORAL
Refills: 0 | Status: ACTIVE | COMMUNITY

## 2024-12-09 RX ORDER — VIT C/ZINC CITRATE/ELDERBERRY 45 MG-50MG
50-45-3.8 TABLET,CHEWABLE ORAL
Refills: 0 | Status: ACTIVE | COMMUNITY

## 2024-12-09 RX ORDER — MULTIVITAMIN
CAPSULE ORAL
Refills: 0 | Status: ACTIVE | COMMUNITY

## 2024-12-09 RX ORDER — BACILLUS COAGULANS/INULIN 1B-250 MG
1-250 CAPSULE ORAL
Refills: 0 | Status: ACTIVE | COMMUNITY

## 2024-12-09 RX ORDER — UBIDECARENONE/VIT E ACET 100MG-5
1000 CAPSULE ORAL
Refills: 0 | Status: ACTIVE | COMMUNITY

## 2024-12-10 PROBLEM — H69.93 DYSFUNCTION OF BOTH EUSTACHIAN TUBES: Status: ACTIVE | Noted: 2024-12-10

## 2025-01-02 DIAGNOSIS — H61.23 IMPACTED CERUMEN, BILATERAL: ICD-10-CM

## 2025-01-02 DIAGNOSIS — H90.3 SENSORINEURAL HEARING LOSS, BILATERAL: ICD-10-CM

## 2025-01-02 DIAGNOSIS — H93.293 OTHER ABNORMAL AUDITORY PERCEPTIONS, BILATERAL: ICD-10-CM

## 2025-01-02 DIAGNOSIS — H69.93 UNSPECIFIED EUSTACHIAN TUBE DISORDER, BILATERAL: ICD-10-CM

## 2025-02-14 DIAGNOSIS — Z98.890 OTHER SPECIFIED POSTPROCEDURAL STATES: ICD-10-CM

## 2025-02-20 ENCOUNTER — NON-APPOINTMENT (OUTPATIENT)
Age: 40
End: 2025-02-20

## 2025-02-20 ENCOUNTER — APPOINTMENT (OUTPATIENT)
Dept: PEDIATRIC CARDIOLOGY | Facility: CLINIC | Age: 40
End: 2025-02-20
Payer: MEDICAID

## 2025-02-20 ENCOUNTER — RESULT CHARGE (OUTPATIENT)
Age: 40
End: 2025-02-20

## 2025-02-20 VITALS
HEIGHT: 57 IN | SYSTOLIC BLOOD PRESSURE: 117 MMHG | OXYGEN SATURATION: 100 % | HEART RATE: 76 BPM | WEIGHT: 115.08 LBS | DIASTOLIC BLOOD PRESSURE: 71 MMHG | BODY MASS INDEX: 24.83 KG/M2

## 2025-02-20 DIAGNOSIS — Q21.23 COMPLETE ATRIOVENTRICULAR SEPTAL DEFECT: ICD-10-CM

## 2025-02-20 DIAGNOSIS — Q21.20 ATRIOVENTRICULAR SEPTAL DEFECT, UNSPECIFIED AS TO PARTIAL OR COMPLETE: ICD-10-CM

## 2025-02-20 DIAGNOSIS — Q90.9 DOWN SYNDROME, UNSPECIFIED: ICD-10-CM

## 2025-02-20 DIAGNOSIS — R00.2 PALPITATIONS: ICD-10-CM

## 2025-02-20 PROCEDURE — 93320 DOPPLER ECHO COMPLETE: CPT

## 2025-02-20 PROCEDURE — 99214 OFFICE O/P EST MOD 30 MIN: CPT

## 2025-02-20 PROCEDURE — 93303 ECHO TRANSTHORACIC: CPT

## 2025-02-20 PROCEDURE — 93325 DOPPLER ECHO COLOR FLOW MAPG: CPT

## 2025-02-20 PROCEDURE — G0404: CPT | Mod: NC

## 2025-03-18 ENCOUNTER — TRANSCRIPTION ENCOUNTER (OUTPATIENT)
Age: 40
End: 2025-03-18

## 2025-04-08 ENCOUNTER — TRANSCRIPTION ENCOUNTER (OUTPATIENT)
Age: 40
End: 2025-04-08

## 2025-05-05 ENCOUNTER — TRANSCRIPTION ENCOUNTER (OUTPATIENT)
Age: 40
End: 2025-05-05